# Patient Record
Sex: FEMALE | URBAN - METROPOLITAN AREA
[De-identification: names, ages, dates, MRNs, and addresses within clinical notes are randomized per-mention and may not be internally consistent; named-entity substitution may affect disease eponyms.]

---
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
## 2021-05-04 ENCOUNTER — OFFICE VISIT (OUTPATIENT)
Dept: URGENT CARE | Facility: CLINIC | Age: 21
End: 2021-05-04
Payer: OTHER GOVERNMENT

## 2021-05-04 VITALS
BODY MASS INDEX: 22.2 KG/M2 | RESPIRATION RATE: 16 BRPM | TEMPERATURE: 98.6 F | SYSTOLIC BLOOD PRESSURE: 120 MMHG | WEIGHT: 130 LBS | HEART RATE: 118 BPM | OXYGEN SATURATION: 96 % | DIASTOLIC BLOOD PRESSURE: 70 MMHG | HEIGHT: 64 IN

## 2021-05-04 DIAGNOSIS — B97.89 SORE THROAT (VIRAL): ICD-10-CM

## 2021-05-04 DIAGNOSIS — J02.8 SORE THROAT (VIRAL): ICD-10-CM

## 2021-05-04 LAB
HETEROPH AB SER QL LA: NEGATIVE
INT CON NEG: NEGATIVE
INT CON NEG: NEGATIVE
INT CON POS: POSITIVE
INT CON POS: POSITIVE
S PYO AG THROAT QL: NEGATIVE

## 2021-05-04 PROCEDURE — 86308 HETEROPHILE ANTIBODY SCREEN: CPT | Performed by: PHYSICIAN ASSISTANT

## 2021-05-04 PROCEDURE — 99203 OFFICE O/P NEW LOW 30 MIN: CPT | Performed by: PHYSICIAN ASSISTANT

## 2021-05-04 PROCEDURE — 87880 STREP A ASSAY W/OPTIC: CPT | Performed by: PHYSICIAN ASSISTANT

## 2021-05-04 ASSESSMENT — ENCOUNTER SYMPTOMS
COUGH: 1
EYE REDNESS: 0
SHORTNESS OF BREATH: 0
TROUBLE SWALLOWING: 0
EYE DISCHARGE: 0
FEVER: 1
HEADACHES: 1
DIARRHEA: 0
SORE THROAT: 1
NAUSEA: 0
MYALGIAS: 0
VOMITING: 0

## 2021-05-04 NOTE — PROGRESS NOTES
Subjective:      Scott Lake is a 20 y.o. female who presents with Fever (headache, sore throat. x yesterday. )        Pharyngitis   This is a new problem. Episode onset: x1 day ago. The problem has been unchanged. Neither side of throat is experiencing more pain than the other. Maximum temperature: The patient reports a subjective fever. The pain is mild. Associated symptoms include congestion (The patient reports a runny nose.), coughing (The patient reports a slight cough.) and headaches. Pertinent negatives include no diarrhea, drooling, ear pain, shortness of breath, trouble swallowing or vomiting. She has had no exposure to strep. She has tried nothing for the symptoms.     The patient reports no known exposure to COVID-19.  She reports no additional sick contacts.  The patient states she is fully vaccinated against COVID-19.  The patient states she was also previously diagnosed with COVID-19, and states her symptoms are not similar to her previous COVID-19 infection    PMH:  has no past medical history on file.  MEDS:   Current Outpatient Medications:   •  Drospirenone-Ethinyl Estradiol (LORYNA PO), Take  by mouth., Disp: , Rfl:   ALLERGIES: Not on File  SURGHX: History reviewed. No pertinent surgical history.  SOCHX:  reports that she has never smoked. She has never used smokeless tobacco.  FH: Family history was reviewed, no pertinent findings to report\      Review of Systems   Constitutional: Positive for fever (The patient reports a subjective fever.).   HENT: Positive for congestion (The patient reports a runny nose.) and sore throat. Negative for drooling, ear pain and trouble swallowing.    Eyes: Negative for discharge and redness.   Respiratory: Positive for cough (The patient reports a slight cough.). Negative for shortness of breath.    Cardiovascular: Negative for chest pain and leg swelling.   Gastrointestinal: Negative for diarrhea, nausea and vomiting.   Musculoskeletal: Negative for myalgias.  "  Neurological: Positive for headaches.          Objective:     /70   Pulse (!) 118   Temp 37 °C (98.6 °F) (Temporal)   Resp 16   Ht 1.626 m (5' 4\")   Wt 59 kg (130 lb)   SpO2 96%   BMI 22.31 kg/m²      Physical Exam  Constitutional:       General: She is not in acute distress.     Appearance: Normal appearance. She is not ill-appearing.   HENT:      Head: Normocephalic and atraumatic.      Right Ear: External ear normal.      Left Ear: External ear normal.      Nose: Nose normal.      Mouth/Throat:      Mouth: Mucous membranes are moist.      Pharynx: Oropharynx is clear. Uvula midline. Posterior oropharyngeal erythema present.      Tonsils: No tonsillar exudate.   Eyes:      Extraocular Movements: Extraocular movements intact.      Conjunctiva/sclera: Conjunctivae normal.   Cardiovascular:      Rate and Rhythm: Regular rhythm. Tachycardia present.      Heart sounds: Normal heart sounds.   Pulmonary:      Effort: Pulmonary effort is normal. No respiratory distress.      Breath sounds: Normal breath sounds. No wheezing.   Musculoskeletal:         General: Normal range of motion.      Cervical back: Normal range of motion and neck supple.   Skin:     General: Skin is warm and dry.   Neurological:      Mental Status: She is alert and oriented to person, place, and time.            Progress:  POCT Rapid Strep: Negative     POCT Mono:Negative    Offered the patient a throat culture to rule out other possible bacterial sources.  The patient declined a throat culture at this time.    Offered the patient COVID-19 testing, which she politely declined.    Assessment/Plan:        1. Sore throat (viral)  - POCT Rapid Strep A  - POCT Mononucleosis (mono)    Discussed likely viral etiology with the patient.  Advised patient to return to clinic for any worsening or concerning symptoms.  Recommend OTC medications and supportive care for symptomatic management.  Recommend patient follow-up with PCP as needed.  " Discussed return precautions with the patient, and she verbalized understanding.    Differential diagnoses, supportive care, and indications for immediate follow-up discussed with patient.   Instructed to return to clinic or nearest emergency department for any change in condition, further concerns, or worsening of symptoms.    OTC Tylenol or Motrin for fever/discomfort.  OTC Supportive Care for Sore Throat - warm salt water gargles, sore throat lozenges, warm lemon water, and/or tea.  Drink plenty of fluids  Follow-up with PCP   Return to clinic or go to the ED if symptoms worsen or fail to improve, or if patient should develop worsening/increasing sore throat, difficulty swallowing, drooling, change in voice, swollen glands, shortness of breath, ear pain, cough, congestion, fever/chills, and/or any concerning symptoms.    Discussed plan with the patient, and she agrees to the above.     I personally reviewed prior external notes and test results pertinent to today's visit.  I have independently reviewed and interpreted all diagnostics ordered during this urgent care visit.     Time spent evaluating this patient was at least 30 minutes and includes preparing for visit, obtaining history, exam and evaluation, ordering labs/tests/procedures/medications, independent interpretation, and counseling/education.    Please note that this dictation was created using voice recognition software. I have made every reasonable attempt to correct obvious errors, but I expect that there may be errors of grammar and possibly content that I did not discover before finalizing the note.     This note was electronically signed by Tiera Zhu PA-C

## 2022-05-20 ENCOUNTER — HOSPITAL ENCOUNTER (EMERGENCY)
Facility: MEDICAL CENTER | Age: 22
End: 2022-05-20
Attending: EMERGENCY MEDICINE
Payer: OTHER GOVERNMENT

## 2022-05-20 VITALS
DIASTOLIC BLOOD PRESSURE: 60 MMHG | HEIGHT: 64 IN | BODY MASS INDEX: 23.75 KG/M2 | WEIGHT: 139.11 LBS | RESPIRATION RATE: 14 BRPM | TEMPERATURE: 97.6 F | OXYGEN SATURATION: 99 % | SYSTOLIC BLOOD PRESSURE: 100 MMHG | HEART RATE: 74 BPM

## 2022-05-20 DIAGNOSIS — R10.9 ABDOMINAL CRAMPS: ICD-10-CM

## 2022-05-20 LAB
ALBUMIN SERPL BCP-MCNC: 4.5 G/DL (ref 3.2–4.9)
ALBUMIN/GLOB SERPL: 1.5 G/DL
ALP SERPL-CCNC: 104 U/L (ref 30–99)
ALT SERPL-CCNC: 18 U/L (ref 2–50)
ANION GAP SERPL CALC-SCNC: 10 MMOL/L (ref 7–16)
APPEARANCE UR: ABNORMAL
AST SERPL-CCNC: 19 U/L (ref 12–45)
BACTERIA #/AREA URNS HPF: NEGATIVE /HPF
BASOPHILS # BLD AUTO: 0.7 % (ref 0–1.8)
BASOPHILS # BLD: 0.05 K/UL (ref 0–0.12)
BILIRUB SERPL-MCNC: 0.7 MG/DL (ref 0.1–1.5)
BILIRUB UR QL STRIP.AUTO: NEGATIVE
BUN SERPL-MCNC: 12 MG/DL (ref 8–22)
CALCIUM SERPL-MCNC: 9.2 MG/DL (ref 8.5–10.5)
CHLORIDE SERPL-SCNC: 106 MMOL/L (ref 96–112)
CO2 SERPL-SCNC: 23 MMOL/L (ref 20–33)
COLOR UR: YELLOW
CREAT SERPL-MCNC: 0.61 MG/DL (ref 0.5–1.4)
EOSINOPHIL # BLD AUTO: 0.2 K/UL (ref 0–0.51)
EOSINOPHIL NFR BLD: 2.9 % (ref 0–6.9)
EPI CELLS #/AREA URNS HPF: ABNORMAL /HPF
ERYTHROCYTE [DISTWIDTH] IN BLOOD BY AUTOMATED COUNT: 45.7 FL (ref 35.9–50)
GFR SERPLBLD CREATININE-BSD FMLA CKD-EPI: 130 ML/MIN/1.73 M 2
GLOBULIN SER CALC-MCNC: 3 G/DL (ref 1.9–3.5)
GLUCOSE SERPL-MCNC: 82 MG/DL (ref 65–99)
GLUCOSE UR STRIP.AUTO-MCNC: NEGATIVE MG/DL
HCG SERPL QL: NEGATIVE
HCT VFR BLD AUTO: 38.7 % (ref 37–47)
HGB BLD-MCNC: 12.4 G/DL (ref 12–16)
HYALINE CASTS #/AREA URNS LPF: ABNORMAL /LPF
IMM GRANULOCYTES # BLD AUTO: 0.01 K/UL (ref 0–0.11)
IMM GRANULOCYTES NFR BLD AUTO: 0.1 % (ref 0–0.9)
KETONES UR STRIP.AUTO-MCNC: ABNORMAL MG/DL
LEUKOCYTE ESTERASE UR QL STRIP.AUTO: NEGATIVE
LIPASE SERPL-CCNC: 44 U/L (ref 11–82)
LYMPHOCYTES # BLD AUTO: 2.59 K/UL (ref 1–4.8)
LYMPHOCYTES NFR BLD: 37.5 % (ref 22–41)
MCH RBC QN AUTO: 25.4 PG (ref 27–33)
MCHC RBC AUTO-ENTMCNC: 32 G/DL (ref 33.6–35)
MCV RBC AUTO: 79.3 FL (ref 81.4–97.8)
MICRO URNS: ABNORMAL
MONOCYTES # BLD AUTO: 0.52 K/UL (ref 0–0.85)
MONOCYTES NFR BLD AUTO: 7.5 % (ref 0–13.4)
NEUTROPHILS # BLD AUTO: 3.54 K/UL (ref 2–7.15)
NEUTROPHILS NFR BLD: 51.3 % (ref 44–72)
NITRITE UR QL STRIP.AUTO: NEGATIVE
NRBC # BLD AUTO: 0 K/UL
NRBC BLD-RTO: 0 /100 WBC
PH UR STRIP.AUTO: 8 [PH] (ref 5–8)
PLATELET # BLD AUTO: 283 K/UL (ref 164–446)
PMV BLD AUTO: 11.4 FL (ref 9–12.9)
POTASSIUM SERPL-SCNC: 3.5 MMOL/L (ref 3.6–5.5)
PROT SERPL-MCNC: 7.5 G/DL (ref 6–8.2)
PROT UR QL STRIP: NEGATIVE MG/DL
RBC # BLD AUTO: 4.88 M/UL (ref 4.2–5.4)
RBC # URNS HPF: ABNORMAL /HPF
RBC UR QL AUTO: NEGATIVE
SODIUM SERPL-SCNC: 139 MMOL/L (ref 135–145)
SP GR UR STRIP.AUTO: 1.02
UROBILINOGEN UR STRIP.AUTO-MCNC: 1 MG/DL
WBC # BLD AUTO: 6.9 K/UL (ref 4.8–10.8)
WBC #/AREA URNS HPF: ABNORMAL /HPF

## 2022-05-20 PROCEDURE — 83690 ASSAY OF LIPASE: CPT

## 2022-05-20 PROCEDURE — 80053 COMPREHEN METABOLIC PANEL: CPT

## 2022-05-20 PROCEDURE — 85025 COMPLETE CBC W/AUTO DIFF WBC: CPT

## 2022-05-20 PROCEDURE — 700111 HCHG RX REV CODE 636 W/ 250 OVERRIDE (IP): Performed by: EMERGENCY MEDICINE

## 2022-05-20 PROCEDURE — 99285 EMERGENCY DEPT VISIT HI MDM: CPT

## 2022-05-20 PROCEDURE — 96374 THER/PROPH/DIAG INJ IV PUSH: CPT

## 2022-05-20 PROCEDURE — A9270 NON-COVERED ITEM OR SERVICE: HCPCS | Performed by: EMERGENCY MEDICINE

## 2022-05-20 PROCEDURE — 36415 COLL VENOUS BLD VENIPUNCTURE: CPT

## 2022-05-20 PROCEDURE — 81001 URINALYSIS AUTO W/SCOPE: CPT

## 2022-05-20 PROCEDURE — 700102 HCHG RX REV CODE 250 W/ 637 OVERRIDE(OP): Performed by: EMERGENCY MEDICINE

## 2022-05-20 PROCEDURE — 84703 CHORIONIC GONADOTROPIN ASSAY: CPT

## 2022-05-20 RX ORDER — DICYCLOMINE HCL 20 MG
20 TABLET ORAL ONCE
Status: COMPLETED | OUTPATIENT
Start: 2022-05-20 | End: 2022-05-20

## 2022-05-20 RX ORDER — SUCRALFATE ORAL 1 G/10ML
1 SUSPENSION ORAL 4 TIMES DAILY
Qty: 280 ML | Refills: 0 | Status: SHIPPED | OUTPATIENT
Start: 2022-05-20 | End: 2022-05-27

## 2022-05-20 RX ORDER — SUCRALFATE ORAL 1 G/10ML
1 SUSPENSION ORAL 4 TIMES DAILY
Qty: 280 ML | Refills: 0 | Status: SHIPPED | OUTPATIENT
Start: 2022-05-20 | End: 2022-05-20 | Stop reason: SDUPTHER

## 2022-05-20 RX ORDER — KETOROLAC TROMETHAMINE 30 MG/ML
15 INJECTION, SOLUTION INTRAMUSCULAR; INTRAVENOUS ONCE
Status: COMPLETED | OUTPATIENT
Start: 2022-05-20 | End: 2022-05-20

## 2022-05-20 RX ORDER — DICYCLOMINE HCL 20 MG
20 TABLET ORAL EVERY 6 HOURS
Qty: 28 TABLET | Refills: 0 | Status: SHIPPED | OUTPATIENT
Start: 2022-05-20 | End: 2022-05-20 | Stop reason: SDUPTHER

## 2022-05-20 RX ORDER — DICYCLOMINE HCL 20 MG
20 TABLET ORAL EVERY 6 HOURS
Qty: 28 TABLET | Refills: 0 | Status: SHIPPED | OUTPATIENT
Start: 2022-05-20 | End: 2022-05-27

## 2022-05-20 RX ORDER — SUCRALFATE ORAL 1 G/10ML
1 SUSPENSION ORAL EVERY 6 HOURS
Status: DISCONTINUED | OUTPATIENT
Start: 2022-05-20 | End: 2022-05-20 | Stop reason: HOSPADM

## 2022-05-20 RX ADMIN — KETOROLAC TROMETHAMINE 15 MG: 30 INJECTION, SOLUTION INTRAMUSCULAR at 20:11

## 2022-05-20 RX ADMIN — DICYCLOMINE HYDROCHLORIDE 20 MG: 20 TABLET ORAL at 20:12

## 2022-05-20 RX ADMIN — SUCRALFATE 1 G: 1 SUSPENSION ORAL at 20:12

## 2022-05-21 NOTE — ED NOTES
Pt ambulated to room from [facility]. Provided urine sample. Changed into gown. Connected to monitor. Agree with triage note. Chart up for ERP. Call light in reach.

## 2022-05-21 NOTE — ED PROVIDER NOTES
"ED Provider Note    Scribed for Magan Rubio M.D. by Marie Soto. 5/20/2022,  6:21 PM.    CHIEF COMPLAINT  Chief Complaint   Patient presents with   • Abdominal Pain     Lower abdominal pain since Wednesday. Denies N/V/D and only endorses extreme lower abdominal that feels \"sharp\" and \"pinching.\" Pain worsens with bending over and movement.        HPI  Scott Lake is a 22 y.o. female who presents to the Emergency Department for sharp, intermittent lower mid abdominal pain onset Wednesday. Patient is in the  and has been having drills the past two weeks. After drinking a half of her coffee, she began to experience abdominal pain which persisted the entire day. Patient states it worsens after eating or drinking. Yesterday, she still experienced the abdominal pain however it was bearable and was not as worse as it was the day before. Today, the pain worsened again after eating, prompting her to come to the ED. She denies nausea, vomiting, diarrhea, changes in bowel movement. Patient does not report of fevers or chills. No alleviating factors were reported.     REVIEW OF SYSTEMS  See HPI for further details. All other systems are negative.     SOCIAL HISTORY  Social History     Tobacco Use   • Smoking status: Never Smoker   • Smokeless tobacco: Never Used     SURGICAL HISTORY  patient denies any surgical history    CURRENT MEDICATIONS  Home Medications     Reviewed by Kalina Galindo R.N. (Registered Nurse) on 05/20/22 at 1744  Med List Status: Partial   Medication Last Dose Status   Drospirenone-Ethinyl Estradiol (LORYNA PO) Not taking Active                ALLERGIES  No Known Allergies    PHYSICAL EXAM  VITAL SIGNS: /63   Pulse 76   Temp 36.3 °C (97.4 °F) (Temporal)   Resp 16   Ht 1.626 m (5' 4\")   Wt 63.1 kg (139 lb 1.8 oz)   LMP 05/10/2022   SpO2 96%   BMI 23.88 kg/m²   Pulse ox interpretation: I interpret this pulse ox as normal.  Constitutional: Alert in no apparent distress.  HENT: " No signs of trauma, Bilateral external ears normal, Nose normal.   Eyes: Conjunctiva normal, Non-icteric.   Neck: Normal range of motion, Supple, No stridor.   Lymphatic: No lymphadenopathy noted.   Cardiovascular: Regular rate and rhythm, no murmurs.   Thorax & Lungs: Normal breath sounds, No respiratory distress, No wheezing, No chest tenderness.   Abdomen: Bowel sounds normal, Soft, mild diffuse lower abdominal tenderness that is more central than in either lower quadrants. No masses, No pulsatile masses. No peritoneal signs.  Skin: Warm, Dry, No erythema, No rash.   Back: No midline bony tenderness.  Extremities: Intact distal pulses, No edema, No cyanosis.  Musculoskeletal: Good range of motion in all major joints. No or major deformities noted.   Neurologic: Alert , Normal motor function, Normal sensory function, No focal deficits noted.   Psychiatric: Affect normal, Judgment normal, Mood normal.     DIAGNOSTIC STUDIES / PROCEDURES    LABS  Labs Reviewed   CBC WITH DIFFERENTIAL - Abnormal; Notable for the following components:       Result Value    MCV 79.3 (*)     MCH 25.4 (*)     MCHC 32.0 (*)     All other components within normal limits   COMP METABOLIC PANEL - Abnormal; Notable for the following components:    Potassium 3.5 (*)     Alkaline Phosphatase 104 (*)     All other components within normal limits   URINALYSIS,CULTURE IF INDICATED - Abnormal; Notable for the following components:    Character Cloudy (*)     Ketones Trace (*)     All other components within normal limits    Narrative:     Indication for culture:->Patient WITHOUT an indwelling [place]  catheter in place with new onset of Dysuria, Frequency,  Urgency, and/or Suprapubic pain   URINE MICROSCOPIC (W/UA) - Abnormal; Notable for the following components:    RBC 2-5 (*)     All other components within normal limits    Narrative:     Indication for culture:->Patient WITHOUT an indwelling [place]  catheter in place with new onset of Dysuria,  Frequency,  Urgency, and/or Suprapubic pain   LIPASE   HCG QUAL SERUM   ESTIMATED GFR     All labs reviewed by me.    COURSE & MEDICAL DECISION MAKING  Nursing notes, VS, PMSFHx reviewed in chart.     6:21 PM Patient seen and examined at bedside. Differential diagnosis includes but is not limited to appendicitis, UTI, diverticulitis, pregnancy, ectopic pregnancy, or GI virus. Ordered for CBC w/ diff, CMP, Lipase, HCG Qual Serum, and UA to evaluate.  However, each of the patient's exacerbation of pain episodes have been after eating.  She has also had symptoms for several days, with a benign abdominal exam despite some amount of tenderness, normal vital signs.  We discussed that I think this favors inflammatory or viral rather than bacterial illness. Patient will be treated with Bentyl 20 mg, Carafate 1 g, and Toradol 15 mg.     7:31 PM - Patient was reevaluated at bedside. Updated on lab findings, as shown above. I informed the patient for plans of discharge and return instructions. She was advised to avoid coffee, caffeinated products, or alcohol. Patient verbalizes understanding and agreement to this plan of care.  We discussed supportive care and return precautions, on the presumption that her symptoms and laboratory results demonstrate food intolerance versus viral illness.  We discussed clear return precautions if she is getting worse instead of gradually better.    The patient will return for new or worsening symptoms and is stable at the time of discharge.    DISPOSITION:  Patient will be discharged home in stable condition.    FOLLOW UP:  [facility], Emergency Dept  [address] [id number] [phone]    If symptoms worsen      OUTPATIENT MEDICATIONS:  Discharge Medication List as of 5/20/2022  8:22 PM      START taking these medications    Details   dicyclomine (BENTYL) 20 MG Tab Take 1 Tablet by mouth every 6 hours for 7 days., Disp-28 Tablet, R-0, Normal       sucralfate (CARAFATE) 1 GM/10ML Suspension Take 10 mL by mouth 4 times a day for 7 days., Disp-280 mL, R-0, Normal              FINAL IMPRESSION  1. Abdominal cramps         Marie SAMANO (Yanci), am scribing for, and in the presence of, Magan Rubio M.D..    Electronically signed by: Marie Soto (Yanci), 5/20/2022    I, Magan Rubio M.D. personally performed the services described in this documentation, as scribed by Marie Soto in my presence, and it is both accurate and complete.    The note accurately reflects work and decisions made by me.  Magan Rubio M.D.  5/20/2022  8:49 PM

## 2022-05-21 NOTE — ED TRIAGE NOTES
"Chief Complaint   Patient presents with   • Abdominal Pain     Lower abdominal pain since Wednesday. Denies N/V/D and only endorses extreme lower abdominal that feels \"sharp\" and \"pinching.\" Pain worsens with bending over and movement.      Denies chance of pregnancy. Last menstrual cycle 5/10.     Pt amb to triage with steady gait for above complaint. Protocol ordered.      Pt is alert and oriented, speaking in full sentences, follows commands and responds appropriately to questions. NAD. Resp are even and unlabored.     Pt placed in lobby. Pt educated on triage process. Pt encouraged to alert staff for any changes.    This RN masked and in appropriate PPE during encounter. Patient also in mask.     /63   Pulse 76   Temp 36.3 °C (97.4 °F) (Temporal)   Resp 16   Ht 1.626 m (5' 4\")   Wt 63.1 kg (139 lb 1.8 oz)   SpO2 96%       "

## 2022-05-21 NOTE — DISCHARGE INSTRUCTIONS
Read the attached information about a bland diet.  Your tests here were all very reassuring, so the most likely cause of your symptoms is a viral stomach illness.  Use the medications that we have prescribed, and addition to the dietary recommendations.    Return for reevaluation if you are getting worse, instead gradually better over the next couple of days.

## 2024-02-07 ENCOUNTER — OFFICE VISIT (OUTPATIENT)
Dept: URGENT CARE | Facility: CLINIC | Age: 24
End: 2024-02-07
Payer: OTHER GOVERNMENT

## 2024-02-07 ENCOUNTER — HOSPITAL ENCOUNTER (OUTPATIENT)
Facility: MEDICAL CENTER | Age: 24
End: 2024-02-07
Payer: OTHER GOVERNMENT

## 2024-02-07 VITALS
HEART RATE: 72 BPM | WEIGHT: 133 LBS | HEIGHT: 64 IN | RESPIRATION RATE: 14 BRPM | SYSTOLIC BLOOD PRESSURE: 114 MMHG | DIASTOLIC BLOOD PRESSURE: 74 MMHG | OXYGEN SATURATION: 100 % | BODY MASS INDEX: 22.71 KG/M2 | TEMPERATURE: 97.8 F

## 2024-02-07 DIAGNOSIS — R10.30 LOWER ABDOMINAL PAIN: ICD-10-CM

## 2024-02-07 LAB
APPEARANCE UR: CLEAR
BILIRUB UR STRIP-MCNC: NEGATIVE MG/DL
COLOR UR AUTO: YELLOW
GLUCOSE UR STRIP.AUTO-MCNC: NEGATIVE MG/DL
KETONES UR STRIP.AUTO-MCNC: NEGATIVE MG/DL
LEUKOCYTE ESTERASE UR QL STRIP.AUTO: NEGATIVE
NITRITE UR QL STRIP.AUTO: NEGATIVE
PH UR STRIP.AUTO: 7 [PH] (ref 5–8)
POCT INT CON NEG: NEGATIVE
POCT INT CON POS: POSITIVE
POCT URINE PREGNANCY TEST: NEGATIVE
PROT UR QL STRIP: NEGATIVE MG/DL
RBC UR QL AUTO: NORMAL
SP GR UR STRIP.AUTO: 1.02
UROBILINOGEN UR STRIP-MCNC: 0.2 MG/DL

## 2024-02-07 PROCEDURE — 81025 URINE PREGNANCY TEST: CPT

## 2024-02-07 PROCEDURE — 87086 URINE CULTURE/COLONY COUNT: CPT

## 2024-02-07 PROCEDURE — 81002 URINALYSIS NONAUTO W/O SCOPE: CPT

## 2024-02-07 PROCEDURE — 99203 OFFICE O/P NEW LOW 30 MIN: CPT

## 2024-02-07 PROCEDURE — 3078F DIAST BP <80 MM HG: CPT

## 2024-02-07 PROCEDURE — 3074F SYST BP LT 130 MM HG: CPT

## 2024-02-07 ASSESSMENT — FIBROSIS 4 INDEX: FIB4 SCORE: 0.36

## 2024-02-08 DIAGNOSIS — R10.30 LOWER ABDOMINAL PAIN: ICD-10-CM

## 2024-02-08 ASSESSMENT — ENCOUNTER SYMPTOMS
WHEEZING: 0
COUGH: 0
CHILLS: 0
NECK PAIN: 0
CONSTIPATION: 0
ABDOMINAL PAIN: 1
STRIDOR: 0
SORE THROAT: 0
SHORTNESS OF BREATH: 0
FEVER: 0
NAUSEA: 1
BACK PAIN: 0
VOMITING: 0
HEADACHES: 0
DIZZINESS: 0
FLANK PAIN: 0
DIARRHEA: 0
WEAKNESS: 0
BLURRED VISION: 0
MYALGIAS: 0
SPUTUM PRODUCTION: 0
BLOOD IN STOOL: 0

## 2024-02-08 NOTE — PROGRESS NOTES
Subjective     Scott Lake is a 23 y.o. female who presents with intermittent episodes of lower abdominal pain.     HPI:   Scott is a 22yo female presenting for intermittent generalized lower abdominal pain. Reports this pain has occurred one month ago and again today, completely relieved post ibuprofen. She is currently on her menstrual period. Reports associated nausea when pain occurs. Denies abnormal vaginal discharge or itching. No flank pain. Denies dysuria. LBM today and formed, soft. Denies constipation or concern for STI. No fever. Denies vomiting or diarrhea. No rash. Reports pain as severe when it occurs, lasting for approximately 6 hours today until relieved with ibuprofen. Pain is localized to pelvic region and is not associated with a single quadrant. Pain does not radiate.       Review of Systems   Constitutional:  Negative for chills, fever and malaise/fatigue.   HENT:  Negative for sore throat.    Eyes:  Negative for blurred vision.   Respiratory:  Negative for cough, sputum production, shortness of breath, wheezing and stridor.    Cardiovascular:  Negative for chest pain and leg swelling.   Gastrointestinal:  Positive for abdominal pain and nausea. Negative for blood in stool, constipation, diarrhea, melena and vomiting.   Genitourinary:  Negative for dysuria, flank pain, frequency, hematuria and urgency.   Musculoskeletal:  Negative for back pain, myalgias and neck pain.   Skin:  Negative for rash.   Neurological:  Negative for dizziness, weakness and headaches.     History reviewed. No pertinent past medical history.     History reviewed. No pertinent surgical history.     Patient has no known allergies.     Social History     Tobacco Use    Smoking status: Never    Smokeless tobacco: Never      History reviewed. No pertinent family history.     Medications, Allergies, and current problem list reviewed today in Epic.      Objective     /74 (BP Location: Right arm, Patient Position:  "Sitting, BP Cuff Size: Adult)   Pulse 72   Temp 36.6 °C (97.8 °F)   Resp 14   Ht 1.626 m (5' 4\")   Wt 60.3 kg (133 lb)   SpO2 100%   BMI 22.83 kg/m²      Physical Exam  Vitals reviewed.   Constitutional:       General: She is not in acute distress.  HENT:      Nose: Nose normal.   Eyes:      Extraocular Movements: Extraocular movements intact.      Conjunctiva/sclera: Conjunctivae normal.      Pupils: Pupils are equal, round, and reactive to light.   Cardiovascular:      Rate and Rhythm: Normal rate and regular rhythm.      Pulses: Normal pulses.      Heart sounds: Normal heart sounds.   Pulmonary:      Effort: Pulmonary effort is normal. No respiratory distress.      Breath sounds: Normal breath sounds.   Abdominal:      General: Abdomen is flat. Bowel sounds are normal. There is no distension.      Palpations: Abdomen is soft. There is no hepatomegaly or splenomegaly.      Tenderness: There is abdominal tenderness in the right lower quadrant, suprapubic area and left lower quadrant. There is no right CVA tenderness, left CVA tenderness, guarding or rebound. Negative signs include Hanna's sign, Rovsing's sign and McBurney's sign.      Hernia: No hernia is present.   Musculoskeletal:      Cervical back: Normal range of motion and neck supple.   Skin:     General: Skin is warm and dry.   Neurological:      Mental Status: She is alert. Mental status is at baseline.   Psychiatric:         Mood and Affect: Mood normal.         Behavior: Behavior normal.         Thought Content: Thought content normal.       Results for orders placed or performed in visit on 02/07/24   POCT Urinalysis   Result Value Ref Range    POC Color Yellow Negative    POC Appearance Clear Negative    POC Glucose Negative Negative mg/dL    POC Bilirubin Negative Negative mg/dL    POC Ketones Negative Negative mg/dL    POC Specific Gravity 1.025 <1.005 - >1.030    POC Blood Large Negative    POC Urine PH 7.0 5.0 - 8.0    POC Protein Negative " Negative mg/dL    POC Urobiligen 0.2 Negative (0.2) mg/dL    POC Nitrites Negative Negative    POC Leukocyte Esterase Negative Negative   POCT Pregnancy   Result Value Ref Range    POC Urine Pregnancy Test Negative     Internal Control Positive Positive     Internal Control Negative Negative      Patient declined vaginal pathogen DNA panel     Assessment & Plan     1. Lower abdominal pain   - POCT Urinalysis  - URINE CULTURE(NEW); Future  - POCT Pregnancy       MDM/Comments:   Intermittent episodes of lower abdominal pain relieved with ibuprofen. In office urinalysis with no evidence of urinary infection. Hcg negative. 2 total episodes thus far and no current symptoms. Patient declined vaginal pathogen DNA testing.   No red flag/alarm features present on history or examination. Abdomen exam within normal limits. Discussion with patient regarding limited ability of urgent care to perform more in depth abdominal work up. Patient verbalizes she will present to emergency room if pain reoccurs for further evaluation. Strict emergency room precautions given.     Patient advised to follow up with her PCP and gynecologist.     Illness progression and alarm symptoms discussed with patient, emphasizing low threshold for returning to clinic/emergency department for worsening symptoms. Patient is agreeable to the plan and verbalizes understanding, and will follow up if warranted.       Differential diagnosis, natural history, supportive care, and indications for immediate follow-up discussed.       Follow-up as needed if symptoms worsen or fail to improve to PCP, Urgent care or Emergency Room.             Electronically signed by SARAH Billings

## 2024-02-10 LAB
BACTERIA UR CULT: NORMAL
SIGNIFICANT IND 70042: NORMAL
SITE SITE: NORMAL
SOURCE SOURCE: NORMAL

## 2024-10-13 ENCOUNTER — OFFICE VISIT (OUTPATIENT)
Dept: URGENT CARE | Facility: CLINIC | Age: 24
End: 2024-10-13
Payer: OTHER GOVERNMENT

## 2024-10-13 VITALS
OXYGEN SATURATION: 100 % | WEIGHT: 137 LBS | SYSTOLIC BLOOD PRESSURE: 108 MMHG | RESPIRATION RATE: 16 BRPM | HEART RATE: 89 BPM | BODY MASS INDEX: 24.27 KG/M2 | HEIGHT: 63 IN | DIASTOLIC BLOOD PRESSURE: 76 MMHG | TEMPERATURE: 98.2 F

## 2024-10-13 DIAGNOSIS — L02.91 ABSCESS: ICD-10-CM

## 2024-10-13 DIAGNOSIS — H92.02 LEFT EAR PAIN: ICD-10-CM

## 2024-10-13 PROCEDURE — 99213 OFFICE O/P EST LOW 20 MIN: CPT | Performed by: NURSE PRACTITIONER

## 2024-10-13 PROCEDURE — 3078F DIAST BP <80 MM HG: CPT | Performed by: NURSE PRACTITIONER

## 2024-10-13 PROCEDURE — 3074F SYST BP LT 130 MM HG: CPT | Performed by: NURSE PRACTITIONER

## 2024-10-13 RX ORDER — SULFAMETHOXAZOLE AND TRIMETHOPRIM 800; 160 MG/1; MG/1
1 TABLET ORAL 2 TIMES DAILY
Qty: 20 TABLET | Refills: 0 | Status: SHIPPED | OUTPATIENT
Start: 2024-10-13 | End: 2024-10-23

## 2024-12-20 ENCOUNTER — APPOINTMENT (OUTPATIENT)
Dept: OBGYN | Facility: CLINIC | Age: 24
End: 2024-12-20
Payer: OTHER GOVERNMENT

## 2025-01-06 ENCOUNTER — APPOINTMENT (OUTPATIENT)
Dept: OBGYN | Facility: CLINIC | Age: 25
End: 2025-01-06
Payer: OTHER GOVERNMENT

## 2025-01-07 ENCOUNTER — APPOINTMENT (OUTPATIENT)
Dept: OBGYN | Facility: CLINIC | Age: 25
End: 2025-01-07
Payer: OTHER GOVERNMENT